# Patient Record
Sex: FEMALE | Race: WHITE | NOT HISPANIC OR LATINO | URBAN - METROPOLITAN AREA
[De-identification: names, ages, dates, MRNs, and addresses within clinical notes are randomized per-mention and may not be internally consistent; named-entity substitution may affect disease eponyms.]

---

## 2018-01-03 ENCOUNTER — APPOINTMENT (RX ONLY)
Dept: URBAN - METROPOLITAN AREA CLINIC 304 | Facility: CLINIC | Age: 38
Setting detail: DERMATOLOGY
End: 2018-01-03

## 2018-01-03 DIAGNOSIS — L81.8 OTHER SPECIFIED DISORDERS OF PIGMENTATION: ICD-10-CM

## 2018-01-03 PROCEDURE — ? Q-SWITCHED LASER

## 2018-01-03 PROCEDURE — ? COUNSELING

## 2018-01-03 ASSESSMENT — LOCATION ZONE DERM
LOCATION ZONE: TRUNK
LOCATION ZONE: ARM

## 2018-01-03 ASSESSMENT — LOCATION DETAILED DESCRIPTION DERM
LOCATION DETAILED: LEFT ANTERIOR PROXIMAL UPPER ARM
LOCATION DETAILED: RIGHT ANTERIOR PROXIMAL UPPER ARM
LOCATION DETAILED: SUPERIOR THORACIC SPINE
LOCATION DETAILED: RIGHT ANTERIOR SHOULDER

## 2018-01-03 ASSESSMENT — LOCATION SIMPLE DESCRIPTION DERM
LOCATION SIMPLE: UPPER BACK
LOCATION SIMPLE: RIGHT UPPER ARM
LOCATION SIMPLE: RIGHT SHOULDER
LOCATION SIMPLE: LEFT UPPER ARM

## 2018-01-03 NOTE — PROCEDURE: Q-SWITCHED LASER
Spot Size In Mm: 3
External Cooling Fan Speed: 0
Frequency In Hz: 1
Spot Size In Mm: 4
Fluence: 6.5
Fluence: 7
Endpoint: Immediate endpoint whitening
Frequency In Hz: 5
Consent: Written consent obtained, risks reviewed including but not limited to crusting, scabbing, blistering, scarring, darker or lighter pigmentary change, systemic reactions, ulceration, incidental hair removal, bruising, and/or incomplete removal.
Price (Use Numbers Only, No Special Characters Or $): 50.00
Anesthesia Type: 1% lidocaine with epinephrine
Detail Level: Detailed
Frequency In Hz: 2
Post-Care Instructions: I reviewed with the patient in detail post-care instructions. Patient should avoid sunlight and wear sun protection.
Number Of Pulses: 164

## 2018-01-12 ENCOUNTER — APPOINTMENT (RX ONLY)
Dept: URBAN - METROPOLITAN AREA CLINIC 304 | Facility: CLINIC | Age: 38
Setting detail: DERMATOLOGY
End: 2018-01-12

## 2018-01-12 DIAGNOSIS — L81.8 OTHER SPECIFIED DISORDERS OF PIGMENTATION: ICD-10-CM

## 2018-01-12 PROCEDURE — ? LASER TATTOO REMOVAL

## 2018-01-12 ASSESSMENT — LOCATION ZONE DERM
LOCATION ZONE: ARM
LOCATION ZONE: SCALP

## 2018-01-12 ASSESSMENT — LOCATION SIMPLE DESCRIPTION DERM
LOCATION SIMPLE: HAIR
LOCATION SIMPLE: LEFT UPPER ARM

## 2018-01-12 ASSESSMENT — LOCATION DETAILED DESCRIPTION DERM
LOCATION DETAILED: LEFT ANTERIOR PROXIMAL UPPER ARM
LOCATION DETAILED: HAIR

## 2018-01-12 NOTE — HPI: COSMETIC (LASER TATTOO REMOVAL)
Have You Had Laser Tattoo Removal Before?: has not had previous treatments
When Outside In The Sun, Do You...: always burns, never tans

## 2018-01-12 NOTE — PROCEDURE: LASER TATTOO REMOVAL
Pre-Procedure Text: The treatment areas were cleaned and treated with the laser parameters noted above.
Post-Procedure Text: Post care reviewed with patient.
Laser Type: Q-switched Alexandrite 755nm
Price (Use Numbers Only, No Special Characters Or $): 50
Tattoo Color Treated: Black
Laser Type: Q-switched Nd:Yag 1064nm
Spot Size In Mm: 0
Anesthesia Type: 1% lidocaine with epinephrine
Consent: Written consent obtained, risks reviewed including but not limited to crusting, scabbing, blistering, scarring, darker or lighter pigmentary change, systemic reactions, ulceration, incidental hair removal, bruising, and/or incomplete removal.
Spot Size In Mm: 4
Detail Level: Detailed
Post-Care Instructions: I reviewed with the patient in detail post-care instructions. Patient should apply vaseline twice daily to tattoo and keep it covered with a non-stick adhesive dressing.
Endpoint Text: Immediate endpoint: skin whitening .  1064 4mm 4J/cm2 309 pulses
Area In Cm^2: 2

## 2018-01-19 ENCOUNTER — APPOINTMENT (RX ONLY)
Dept: URBAN - METROPOLITAN AREA CLINIC 304 | Facility: CLINIC | Age: 38
Setting detail: DERMATOLOGY
End: 2018-01-19

## 2018-01-19 DIAGNOSIS — L81.8 OTHER SPECIFIED DISORDERS OF PIGMENTATION: ICD-10-CM

## 2018-01-19 PROCEDURE — ? LASER TATTOO REMOVAL

## 2018-01-19 ASSESSMENT — LOCATION ZONE DERM: LOCATION ZONE: TRUNK

## 2018-01-19 ASSESSMENT — LOCATION DETAILED DESCRIPTION DERM: LOCATION DETAILED: SUPERIOR THORACIC SPINE

## 2018-01-19 ASSESSMENT — LOCATION SIMPLE DESCRIPTION DERM: LOCATION SIMPLE: UPPER BACK

## 2018-01-19 NOTE — PROCEDURE: LASER TATTOO REMOVAL
Consent: Written consent obtained, risks reviewed including but not limited to crusting, scabbing, blistering, scarring, darker or lighter pigmentary change, systemic reactions, ulceration, incidental hair removal, bruising, and/or incomplete removal.
Price (Use Numbers Only, No Special Characters Or $): 50
Spot Size In Mm: 0
Fluence: 4
Tattoo Color Treated: Black
Spot Size In Mm: 3
Laser Type: Q-switched Alexandrite 755nm
Laser Type: Q-switched Nd:Yag 1064nm
Endpoint Text: Immediate endpoint: skin whitening .  1064 4mm 4j/cm2 438 pulses
Pre-Procedure Text: The treatment areas were cleaned and treated with the laser parameters noted above.
Fluence: 5
Area In Cm^2: 2
Fluence: 10
Detail Level: Detailed
Anesthesia Type: 1% lidocaine with epinephrine
Post-Procedure Text: Post care reviewed with patient.
Post-Care Instructions: I reviewed with the patient in detail post-care instructions. Patient should apply vaseline twice daily to tattoo and keep it covered with a non-stick adhesive dressing.

## 2018-02-02 ENCOUNTER — APPOINTMENT (RX ONLY)
Dept: URBAN - METROPOLITAN AREA CLINIC 304 | Facility: CLINIC | Age: 38
Setting detail: DERMATOLOGY
End: 2018-02-02

## 2018-02-02 DIAGNOSIS — L81.8 OTHER SPECIFIED DISORDERS OF PIGMENTATION: ICD-10-CM

## 2018-02-02 PROCEDURE — ? LASER TATTOO REMOVAL

## 2018-02-02 ASSESSMENT — LOCATION DETAILED DESCRIPTION DERM: LOCATION DETAILED: RIGHT ANTERIOR SHOULDER

## 2018-02-02 ASSESSMENT — LOCATION SIMPLE DESCRIPTION DERM: LOCATION SIMPLE: RIGHT SHOULDER

## 2018-02-02 ASSESSMENT — LOCATION ZONE DERM: LOCATION ZONE: ARM

## 2018-02-02 NOTE — PROCEDURE: LASER TATTOO REMOVAL
Post-Care Instructions: I reviewed with the patient in detail post-care instructions. Patient should apply vaseline twice daily to tattoo and keep it covered with a non-stick adhesive dressing.
Tattoo Color Treated: Black
Spot Size In Mm: 4
Fluence: 0
Laser Type: Q-switched Alexandrite 755nm
Detail Level: Detailed
Endpoint Text: Immediate endpoint: skin whitening .  1064 4mm 4j/cm2 127pulses
Area In Cm^2: 2
Spot Size In Mm: 3
Consent: Written consent obtained, risks reviewed including but not limited to crusting, scabbing, blistering, scarring, darker or lighter pigmentary change, systemic reactions, ulceration, incidental hair removal, bruising, and/or incomplete removal.
Pre-Procedure Text: The treatment areas were cleaned and treated with the laser parameters noted above.
Laser Type: Q-switched Nd:Yag 1064nm
Post-Procedure Text: Post care reviewed with patient.
Fluence: 10
Price (Use Numbers Only, No Special Characters Or $): 50
Fluence: 5
Anesthesia Type: 1% lidocaine with epinephrine

## 2018-02-14 ENCOUNTER — APPOINTMENT (RX ONLY)
Dept: URBAN - METROPOLITAN AREA CLINIC 304 | Facility: CLINIC | Age: 38
Setting detail: DERMATOLOGY
End: 2018-02-14

## 2018-02-14 DIAGNOSIS — L81.8 OTHER SPECIFIED DISORDERS OF PIGMENTATION: ICD-10-CM

## 2018-02-14 PROCEDURE — ? LASER TATTOO REMOVAL

## 2018-02-14 ASSESSMENT — LOCATION DETAILED DESCRIPTION DERM
LOCATION DETAILED: LEFT ANTERIOR PROXIMAL UPPER ARM
LOCATION DETAILED: RIGHT POSTERIOR NECK

## 2018-02-14 ASSESSMENT — LOCATION SIMPLE DESCRIPTION DERM
LOCATION SIMPLE: LEFT UPPER ARM
LOCATION SIMPLE: POSTERIOR NECK

## 2018-02-14 ASSESSMENT — LOCATION ZONE DERM
LOCATION ZONE: ARM
LOCATION ZONE: NECK

## 2018-02-14 NOTE — PROCEDURE: LASER TATTOO REMOVAL
Fluence: 5
Detail Level: Detailed
Laser Type: Q-switched Alexandrite 755nm
Anesthesia Type: 1% lidocaine with epinephrine
Spot Size In Mm: 3
Spot Size In Mm: 0
Tattoo Color Treated: Black
Laser Type: Q-switched Nd:Yag 1064nm
Post-Procedure Text: Post care reviewed with patient.
Post-Care Instructions: I reviewed with the patient in detail post-care instructions. Patient should apply vaseline twice daily to tattoo and keep it covered with a non-stick adhesive dressing.
Spot Size In Mm: 4
Price (Use Numbers Only, No Special Characters Or $): 50
Fluence: 10
Pre-Procedure Text: The treatment areas were cleaned and treated with the laser parameters noted above.
Endpoint Text: Immediate endpoint: skin whitening .  1064 4mm 4j/cm2 236 pulses
Consent: Written consent obtained, risks reviewed including but not limited to crusting, scabbing, blistering, scarring, darker or lighter pigmentary change, systemic reactions, ulceration, incidental hair removal, bruising, and/or incomplete removal.

## 2018-02-23 ENCOUNTER — APPOINTMENT (RX ONLY)
Dept: URBAN - METROPOLITAN AREA CLINIC 304 | Facility: CLINIC | Age: 38
Setting detail: DERMATOLOGY
End: 2018-02-23

## 2018-02-23 DIAGNOSIS — L81.8 OTHER SPECIFIED DISORDERS OF PIGMENTATION: ICD-10-CM

## 2018-02-23 PROCEDURE — ? LASER TATTOO REMOVAL

## 2018-02-23 ASSESSMENT — LOCATION DETAILED DESCRIPTION DERM: LOCATION DETAILED: SUPERIOR THORACIC SPINE

## 2018-02-23 ASSESSMENT — LOCATION ZONE DERM: LOCATION ZONE: TRUNK

## 2018-02-23 ASSESSMENT — LOCATION SIMPLE DESCRIPTION DERM: LOCATION SIMPLE: UPPER BACK

## 2018-02-23 NOTE — PROCEDURE: LASER TATTOO REMOVAL
Pre-Procedure Text: The treatment areas were cleaned and treated with the laser parameters noted above.
Post-Care Instructions: I reviewed with the patient in detail post-care instructions. Patient should apply vaseline twice daily to tattoo and keep it covered with a non-stick adhesive dressing.
Spot Size In Mm: 3
Fluence: 4
Fluence: 0
Post-Procedure Text: Post care reviewed with patient.
Fluence: 10
Endpoint Text: Immediate endpoint: skin whitening .  1064 4mm 4j/cm2 374 pulses
Laser Type: Q-switched Alexandrite 755nm
Tattoo Color Treated: Black
Anesthesia Type: 1% lidocaine with epinephrine
Consent: Written consent obtained, risks reviewed including but not limited to crusting, scabbing, blistering, scarring, darker or lighter pigmentary change, systemic reactions, ulceration, incidental hair removal, bruising, and/or incomplete removal.
Detail Level: Detailed
Laser Type: Q-switched Nd:Yag 1064nm
Area In Cm^2: 15
Price (Use Numbers Only, No Special Characters Or $): 50
Fluence: 5

## 2018-03-07 ENCOUNTER — APPOINTMENT (RX ONLY)
Dept: URBAN - METROPOLITAN AREA CLINIC 304 | Facility: CLINIC | Age: 38
Setting detail: DERMATOLOGY
End: 2018-03-07

## 2018-03-07 DIAGNOSIS — L81.8 OTHER SPECIFIED DISORDERS OF PIGMENTATION: ICD-10-CM

## 2018-03-07 PROCEDURE — ? LASER TATTOO REMOVAL

## 2018-03-07 ASSESSMENT — LOCATION DETAILED DESCRIPTION DERM: LOCATION DETAILED: RIGHT POSTERIOR SHOULDER

## 2018-03-07 ASSESSMENT — LOCATION SIMPLE DESCRIPTION DERM: LOCATION SIMPLE: RIGHT SHOULDER

## 2018-03-07 ASSESSMENT — LOCATION ZONE DERM: LOCATION ZONE: ARM

## 2018-03-07 NOTE — PROCEDURE: LASER TATTOO REMOVAL
Laser Type: Q-switched Nd:Yag 1064nm
Pre-Procedure Text: The treatment areas were cleaned and treated with the laser parameters noted above.
External Cooling Fan Speed: 0
Anesthesia Type: 1% lidocaine with epinephrine
Laser Type: Q-switched Alexandrite 755nm
Fluence: 4
Area In Cm^2: 4.5
Consent: Written consent obtained, risks reviewed including but not limited to crusting, scabbing, blistering, scarring, darker or lighter pigmentary change, systemic reactions, ulceration, incidental hair removal, bruising, and/or incomplete removal.
Spot Size In Mm: 3
Fluence: 10
Endpoint Text: Immediate endpoint: erythema .  1064 4mm 4j/cm2 92pulses
Tattoo Color Treated: Black
Fluence: 5
Price (Use Numbers Only, No Special Characters Or $): 50
Post-Procedure Text: Post care reviewed with patient.
Post-Care Instructions: I reviewed with the patient in detail post-care instructions. Patient should apply vaseline twice daily to tattoo and keep it covered with a non-stick adhesive dressing.
Detail Level: Detailed

## 2018-03-23 ENCOUNTER — APPOINTMENT (RX ONLY)
Dept: URBAN - METROPOLITAN AREA CLINIC 304 | Facility: CLINIC | Age: 38
Setting detail: DERMATOLOGY
End: 2018-03-23

## 2018-03-23 DIAGNOSIS — L81.8 OTHER SPECIFIED DISORDERS OF PIGMENTATION: ICD-10-CM

## 2018-03-23 PROCEDURE — ? LASER TATTOO REMOVAL

## 2018-03-23 ASSESSMENT — LOCATION ZONE DERM
LOCATION ZONE: TRUNK
LOCATION ZONE: ARM
LOCATION ZONE: NECK

## 2018-03-23 ASSESSMENT — LOCATION DETAILED DESCRIPTION DERM
LOCATION DETAILED: RIGHT INFERIOR POSTERIOR NECK
LOCATION DETAILED: LEFT SUPERIOR MEDIAL UPPER BACK
LOCATION DETAILED: LEFT ANTERIOR PROXIMAL UPPER ARM

## 2018-03-23 ASSESSMENT — LOCATION SIMPLE DESCRIPTION DERM
LOCATION SIMPLE: LEFT UPPER ARM
LOCATION SIMPLE: POSTERIOR NECK
LOCATION SIMPLE: LEFT UPPER BACK

## 2018-03-23 NOTE — PROCEDURE: LASER TATTOO REMOVAL
Endpoint Text: Immediate endpoint: erythema and frosting.  1064 4mm 4j/cm2 391 pulses
Area In Cm^2: 15
Post-Care Instructions: I reviewed with the patient in detail post-care instructions. Patient should apply vaseline twice daily to tattoo and keep it covered with a non-stick adhesive dressing.
Anesthesia Type: 1% lidocaine with epinephrine
External Cooling Fan Speed: 0
Fluence: 5
Spot Size In Mm: 4
Pre-Procedure Text: The treatment areas were cleaned and treated with the laser parameters noted above.
Anesthesia Volume In Cc: 3
Tattoo Color Treated: Black
Laser Type: Q-switched Alexandrite 755nm
Laser Type: Q-switched Nd:Yag 1064nm
Post-Procedure Text: Post care reviewed with patient.
Price (Use Numbers Only, No Special Characters Or $): 100
Detail Level: Detailed
Consent: Written consent obtained, risks reviewed including but not limited to crusting, scabbing, blistering, scarring, darker or lighter pigmentary change, systemic reactions, ulceration, incidental hair removal, bruising, and/or incomplete removal.

## 2018-04-06 ENCOUNTER — APPOINTMENT (RX ONLY)
Dept: URBAN - METROPOLITAN AREA CLINIC 304 | Facility: CLINIC | Age: 38
Setting detail: DERMATOLOGY
End: 2018-04-06

## 2018-04-06 DIAGNOSIS — L81.8 OTHER SPECIFIED DISORDERS OF PIGMENTATION: ICD-10-CM

## 2018-04-06 PROCEDURE — ? LASER TATTOO REMOVAL

## 2018-04-06 ASSESSMENT — LOCATION ZONE DERM: LOCATION ZONE: ARM

## 2018-04-06 ASSESSMENT — LOCATION DETAILED DESCRIPTION DERM: LOCATION DETAILED: RIGHT ANTERIOR SHOULDER

## 2018-04-06 ASSESSMENT — LOCATION SIMPLE DESCRIPTION DERM: LOCATION SIMPLE: RIGHT SHOULDER

## 2018-04-06 NOTE — PROCEDURE: LASER TATTOO REMOVAL
Spot Size In Mm: 4
Tattoo Color Treated: Black
Post-Care Instructions: I reviewed with the patient in detail post-care instructions. Patient should apply vaseline twice daily to tattoo and keep it covered with a non-stick adhesive dressing.
Anesthesia Volume In Cc: 3
Fluence: 0
Anesthesia Type: 1% lidocaine with epinephrine
Fluence: 5
Laser Type: Q-switched Nd:Yag 1064nm
Laser Type: Q-switched Alexandrite 755nm
Endpoint Text: Immediate endpoint: erythema 1064 4mm 4.2 j/cm2 92 pulses TX#4
Pre-Procedure Text: The treatment areas were cleaned and treated with the laser parameters noted above.
Fluence: 4.2
Price (Use Numbers Only, No Special Characters Or $): 50
Post-Procedure Text: Post care reviewed with patient.
Consent: Written consent obtained, risks reviewed including but not limited to crusting, scabbing, blistering, scarring, darker or lighter pigmentary change, systemic reactions, ulceration, incidental hair removal, bruising, and/or incomplete removal.
Detail Level: Detailed

## 2018-04-24 ENCOUNTER — APPOINTMENT (RX ONLY)
Dept: URBAN - METROPOLITAN AREA CLINIC 304 | Facility: CLINIC | Age: 38
Setting detail: DERMATOLOGY
End: 2018-04-24

## 2018-04-24 DIAGNOSIS — L81.8 OTHER SPECIFIED DISORDERS OF PIGMENTATION: ICD-10-CM

## 2018-04-24 PROCEDURE — ? LASER TATTOO REMOVAL

## 2018-04-24 ASSESSMENT — LOCATION SIMPLE DESCRIPTION DERM
LOCATION SIMPLE: LEFT UPPER ARM
LOCATION SIMPLE: TRAPEZIAL NECK

## 2018-04-24 ASSESSMENT — LOCATION ZONE DERM
LOCATION ZONE: NECK
LOCATION ZONE: ARM

## 2018-04-24 ASSESSMENT — LOCATION DETAILED DESCRIPTION DERM
LOCATION DETAILED: LEFT ANTERIOR PROXIMAL UPPER ARM
LOCATION DETAILED: MID TRAPEZIAL NECK

## 2018-04-27 ENCOUNTER — APPOINTMENT (RX ONLY)
Dept: URBAN - METROPOLITAN AREA CLINIC 304 | Facility: CLINIC | Age: 38
Setting detail: DERMATOLOGY
End: 2018-04-27

## 2018-04-27 DIAGNOSIS — L81.8 OTHER SPECIFIED DISORDERS OF PIGMENTATION: ICD-10-CM

## 2018-04-27 PROCEDURE — ? LASER TATTOO REMOVAL

## 2018-04-27 ASSESSMENT — LOCATION SIMPLE DESCRIPTION DERM: LOCATION SIMPLE: UPPER BACK

## 2018-04-27 ASSESSMENT — LOCATION DETAILED DESCRIPTION DERM: LOCATION DETAILED: SUPERIOR THORACIC SPINE

## 2018-04-27 ASSESSMENT — LOCATION ZONE DERM: LOCATION ZONE: TRUNK

## 2018-04-27 NOTE — PROCEDURE: LASER TATTOO REMOVAL
Fluence: 0
Laser Type: Q-switched Alexandrite 755nm
Fluence: 4.2
Pre-Procedure Text: The treatment areas were cleaned and treated with the laser parameters noted above.
Post-Procedure Text: Post care reviewed with patient.
Tattoo Color Treated: Black
Laser Type: Q-switched Nd:Yag 1064nm
Anesthesia Type: 1% lidocaine with epinephrine
Spot Size In Mm: 3
Fluence: 11
Consent: Written consent obtained, risks reviewed including but not limited to crusting, scabbing, blistering, scarring, darker or lighter pigmentary change, systemic reactions, ulceration, incidental hair removal, bruising, and/or incomplete removal.
Endpoint Text: Immediate endpoint: erythema 1064 4mm 4.4j/cm2 322pulses
Detail Level: Detailed
Area In Cm^2: 10
Price (Use Numbers Only, No Special Characters Or $): 50
Spot Size In Mm: 4
Post-Care Instructions: I reviewed with the patient in detail post-care instructions. Patient should apply vaseline twice daily to tattoo and keep it covered with a non-stick adhesive dressing.
Fluence: 5

## 2018-05-03 ENCOUNTER — APPOINTMENT (RX ONLY)
Dept: URBAN - METROPOLITAN AREA CLINIC 304 | Facility: CLINIC | Age: 38
Setting detail: DERMATOLOGY
End: 2018-05-03

## 2018-05-03 DIAGNOSIS — L81.8 OTHER SPECIFIED DISORDERS OF PIGMENTATION: ICD-10-CM

## 2018-05-03 PROCEDURE — ? LASER TATTOO REMOVAL

## 2018-05-03 ASSESSMENT — LOCATION ZONE DERM: LOCATION ZONE: ARM

## 2018-05-03 ASSESSMENT — LOCATION SIMPLE DESCRIPTION DERM: LOCATION SIMPLE: RIGHT SHOULDER

## 2018-05-03 ASSESSMENT — LOCATION DETAILED DESCRIPTION DERM: LOCATION DETAILED: RIGHT ANTERIOR SHOULDER

## 2018-05-03 NOTE — PROCEDURE: LASER TATTOO REMOVAL
Spot Size In Mm: 4
Detail Level: Detailed
Spot Size In Mm: 3
External Cooling Fan Speed: 0
Laser Type: Q-switched Alexandrite 755nm
Pre-Procedure Text: The treatment areas were cleaned and treated with the laser parameters noted above.
Laser Type: Q-switched Nd:Yag 1064nm
Post-Care Instructions: I reviewed with the patient in detail post-care instructions. Patient should apply vaseline twice daily to tattoo and keep it covered with a non-stick adhesive dressing.
Consent: Written consent obtained, risks reviewed including but not limited to crusting, scabbing, blistering, scarring, darker or lighter pigmentary change, systemic reactions, ulceration, incidental hair removal, bruising, and/or incomplete removal.
Fluence: 11
Fluence: 5
Price (Use Numbers Only, No Special Characters Or $): 50
Endpoint Text: Immediate endpoint: erythema 1064 4mm 4.2j/cm2 104 pulses
Post-Procedure Text: Post care reviewed with patient.
Tattoo Color Treated: Black
Fluence: 4.2
Anesthesia Type: 1% lidocaine with epinephrine

## 2018-05-25 ENCOUNTER — APPOINTMENT (RX ONLY)
Dept: URBAN - METROPOLITAN AREA CLINIC 304 | Facility: CLINIC | Age: 38
Setting detail: DERMATOLOGY
End: 2018-05-25

## 2018-05-25 DIAGNOSIS — L81.8 OTHER SPECIFIED DISORDERS OF PIGMENTATION: ICD-10-CM

## 2018-05-25 PROCEDURE — ? LASER TATTOO REMOVAL

## 2018-05-25 ASSESSMENT — LOCATION SIMPLE DESCRIPTION DERM
LOCATION SIMPLE: POSTERIOR NECK
LOCATION SIMPLE: LEFT UPPER ARM

## 2018-05-25 ASSESSMENT — LOCATION DETAILED DESCRIPTION DERM
LOCATION DETAILED: LEFT ANTERIOR PROXIMAL UPPER ARM
LOCATION DETAILED: RIGHT INFERIOR POSTERIOR NECK

## 2018-05-25 ASSESSMENT — LOCATION ZONE DERM
LOCATION ZONE: NECK
LOCATION ZONE: ARM

## 2018-05-25 NOTE — PROCEDURE: LASER TATTOO REMOVAL
Fluence: 4.4
Laser Type: Q-switched Alexandrite 755nm
Consent: Written consent obtained, risks reviewed including but not limited to crusting, scabbing, blistering, scarring, darker or lighter pigmentary change, systemic reactions, ulceration, incidental hair removal, bruising, and/or incomplete removal.
Spot Size In Mm: 0
Price (Use Numbers Only, No Special Characters Or $): 50
Detail Level: Detailed
Fluence: 11
Post-Procedure Text: Post care reviewed with patient.
Tattoo Color Treated: Black
Anesthesia Type: 1% lidocaine with epinephrine
Spot Size In Mm: 3
Area In Cm^2: 10
Pre-Procedure Text: The treatment areas were cleaned and treated with the laser parameters noted above.
Spot Size In Mm: 4
Fluence: 5
Post-Care Instructions: I reviewed with the patient in detail post-care instructions. Patient should apply vaseline twice daily to tattoo and keep it covered with a non-stick adhesive dressing.
Endpoint Text: Erythema. 4mm spot 4.4j/cm2 213 pulses
Laser Type: Q-switched Nd:Yag 1064nm

## 2018-06-14 ENCOUNTER — APPOINTMENT (RX ONLY)
Dept: URBAN - METROPOLITAN AREA CLINIC 304 | Facility: CLINIC | Age: 38
Setting detail: DERMATOLOGY
End: 2018-06-14

## 2018-06-14 DIAGNOSIS — L81.8 OTHER SPECIFIED DISORDERS OF PIGMENTATION: ICD-10-CM

## 2018-06-14 PROCEDURE — ? LASER TATTOO REMOVAL

## 2018-06-14 ASSESSMENT — LOCATION DETAILED DESCRIPTION DERM
LOCATION DETAILED: RIGHT MEDIAL TRAPEZIAL NECK
LOCATION DETAILED: RIGHT POSTERIOR SHOULDER
LOCATION DETAILED: MID POSTERIOR NECK

## 2018-06-14 ASSESSMENT — LOCATION ZONE DERM
LOCATION ZONE: ARM
LOCATION ZONE: NECK

## 2018-06-14 ASSESSMENT — LOCATION SIMPLE DESCRIPTION DERM
LOCATION SIMPLE: RIGHT SHOULDER
LOCATION SIMPLE: POSTERIOR NECK

## 2018-06-14 NOTE — PROCEDURE: LASER TATTOO REMOVAL
Detail Level: Detailed
Price (Use Numbers Only, No Special Characters Or $): 50
Post-Care Instructions: I reviewed with the patient in detail post-care instructions. Patient should apply vaseline twice daily to tattoo and keep it covered with a non-stick adhesive dressing.
Laser Type: Q-switched Alexandrite 755nm
Tattoo Color Treated: Black
Area In Cm^2: 12
Anesthesia Type: 1% lidocaine with epinephrine
Spot Size In Mm: 0
Pre-Procedure Text: The treatment areas were cleaned and treated with the laser parameters noted above.
Fluence: 11
Spot Size In Mm: 3
Fluence: 4.4
Fluence: 5
Post-Procedure Text: Post care reviewed with patient.
Laser Type: Q-switched Nd:Yag 1064nm
Spot Size In Mm: 4
Consent: Written consent obtained, risks reviewed including but not limited to crusting, scabbing, blistering, scarring, darker or lighter pigmentary change, systemic reactions, ulceration, incidental hair removal, bruising, and/or incomplete removal.
Endpoint Text: Erythema. 4mm 4.4j/cm2 457pulses

## 2018-06-28 ENCOUNTER — APPOINTMENT (RX ONLY)
Dept: URBAN - METROPOLITAN AREA CLINIC 304 | Facility: CLINIC | Age: 38
Setting detail: DERMATOLOGY
End: 2018-06-28

## 2018-06-28 DIAGNOSIS — L81.8 OTHER SPECIFIED DISORDERS OF PIGMENTATION: ICD-10-CM

## 2018-06-28 PROCEDURE — ? LASER TATTOO REMOVAL

## 2018-06-28 ASSESSMENT — LOCATION ZONE DERM: LOCATION ZONE: ARM

## 2018-06-28 ASSESSMENT — LOCATION SIMPLE DESCRIPTION DERM: LOCATION SIMPLE: LEFT UPPER ARM

## 2018-06-28 ASSESSMENT — LOCATION DETAILED DESCRIPTION DERM: LOCATION DETAILED: LEFT ANTERIOR PROXIMAL UPPER ARM

## 2018-06-28 NOTE — PROCEDURE: LASER TATTOO REMOVAL
Laser Type: Q-switched Alexandrite 755nm
Detail Level: Detailed
Fluence: 4.8
Consent: Written consent obtained, risks reviewed including but not limited to crusting, scabbing, blistering, scarring, darker or lighter pigmentary change, systemic reactions, ulceration, incidental hair removal, bruising, and/or incomplete removal.
Spot Size In Mm: 3
Tattoo Color Treated: Black
Fluence: 11
Endpoint Text: Erythema. 4mm 4.8j/cm2 224 pulses
Fluence: 0
Fluence: 5
Laser Type: Q-switched Nd:Yag 1064nm
Area In Cm^2: 12
Spot Size In Mm: 4
Post-Care Instructions: I reviewed with the patient in detail post-care instructions. Patient should apply vaseline twice daily to tattoo and keep it covered with a non-stick adhesive dressing.
Anesthesia Type: 1% lidocaine with epinephrine
Post-Procedure Text: Post care reviewed with patient.
Pre-Procedure Text: The treatment areas were cleaned and treated with the laser parameters noted above.
Price (Use Numbers Only, No Special Characters Or $): 50

## 2018-07-18 ENCOUNTER — APPOINTMENT (RX ONLY)
Dept: URBAN - METROPOLITAN AREA CLINIC 304 | Facility: CLINIC | Age: 38
Setting detail: DERMATOLOGY
End: 2018-07-18

## 2018-07-18 DIAGNOSIS — L81.8 OTHER SPECIFIED DISORDERS OF PIGMENTATION: ICD-10-CM

## 2018-07-18 PROCEDURE — ? LASER TATTOO REMOVAL

## 2018-07-18 ASSESSMENT — LOCATION DETAILED DESCRIPTION DERM: LOCATION DETAILED: RIGHT POSTERIOR SHOULDER

## 2018-07-18 ASSESSMENT — LOCATION ZONE DERM: LOCATION ZONE: ARM

## 2018-07-18 ASSESSMENT — LOCATION SIMPLE DESCRIPTION DERM: LOCATION SIMPLE: RIGHT SHOULDER

## 2018-07-18 NOTE — PROCEDURE: LASER TATTOO REMOVAL
Post-Care Instructions: I reviewed with the patient in detail post-care instructions. Patient should apply vaseline twice daily to tattoo and keep it covered with a non-stick adhesive dressing.
Fluence: 0
Area In Cm^2: 6
Spot Size In Mm: 3
Laser Type: Q-switched Alexandrite 755nm
Detail Level: Detailed
Anesthesia Type: 1% lidocaine with epinephrine
Post-Procedure Text: Post care reviewed with patient.
Consent: Written consent obtained, risks reviewed including but not limited to crusting, scabbing, blistering, scarring, darker or lighter pigmentary change, systemic reactions, ulceration, incidental hair removal, bruising, and/or incomplete removal.
Spot Size In Mm: 4
Laser Type: Q-switched Nd:Yag 1064nm
Endpoint Text: Erythema. 4mm 5j/cm2 103pulses
Tattoo Color Treated: Black
Fluence: 11
Fluence: 5
Price (Use Numbers Only, No Special Characters Or $): 50
Pre-Procedure Text: The treatment areas were cleaned and treated with the laser parameters noted above.

## 2018-07-25 ENCOUNTER — APPOINTMENT (RX ONLY)
Dept: URBAN - METROPOLITAN AREA CLINIC 304 | Facility: CLINIC | Age: 38
Setting detail: DERMATOLOGY
End: 2018-07-25

## 2018-07-25 DIAGNOSIS — L81.8 OTHER SPECIFIED DISORDERS OF PIGMENTATION: ICD-10-CM

## 2018-07-25 PROCEDURE — ? LASER TATTOO REMOVAL

## 2018-07-25 ASSESSMENT — LOCATION ZONE DERM
LOCATION ZONE: TRUNK
LOCATION ZONE: NECK

## 2018-07-25 ASSESSMENT — LOCATION SIMPLE DESCRIPTION DERM
LOCATION SIMPLE: POSTERIOR NECK
LOCATION SIMPLE: UPPER BACK

## 2018-07-25 ASSESSMENT — LOCATION DETAILED DESCRIPTION DERM
LOCATION DETAILED: RIGHT POSTERIOR NECK
LOCATION DETAILED: SUPERIOR THORACIC SPINE

## 2018-07-25 NOTE — PROCEDURE: LASER TATTOO REMOVAL
Price (Use Numbers Only, No Special Characters Or $): 50
Spot Size In Mm: 3
Post-Care Instructions: I reviewed with the patient in detail post-care instructions. Patient should apply vaseline twice daily to tattoo and keep it covered with a non-stick adhesive dressing.
Laser Type: Q-switched Alexandrite 755nm
Fluence: 5
Endpoint Text: Erythema.  1064 4mm spot 5j/cm2 325 pulses
Anesthesia Type: 1% lidocaine with epinephrine
Fluence: 11
Fluence: 0
Spot Size In Mm: 4
Detail Level: Detailed
Consent: Written consent obtained, risks reviewed including but not limited to crusting, scabbing, blistering, scarring, darker or lighter pigmentary change, systemic reactions, ulceration, incidental hair removal, bruising, and/or incomplete removal.
Area In Cm^2: 10
Tattoo Color Treated: Black
Laser Type: Q-switched Nd:Yag 1064nm
Pre-Procedure Text: The treatment areas were cleaned and treated with the laser parameters noted above.
Post-Procedure Text: Post care reviewed with patient.

## 2018-08-17 ENCOUNTER — APPOINTMENT (RX ONLY)
Dept: URBAN - METROPOLITAN AREA CLINIC 304 | Facility: CLINIC | Age: 38
Setting detail: DERMATOLOGY
End: 2018-08-17

## 2018-08-17 DIAGNOSIS — L81.8 OTHER SPECIFIED DISORDERS OF PIGMENTATION: ICD-10-CM

## 2018-08-17 PROCEDURE — ? LASER TATTOO REMOVAL

## 2018-08-17 ASSESSMENT — LOCATION SIMPLE DESCRIPTION DERM
LOCATION SIMPLE: RIGHT SHOULDER
LOCATION SIMPLE: LEFT SHOULDER

## 2018-08-17 ASSESSMENT — LOCATION ZONE DERM: LOCATION ZONE: ARM

## 2018-08-17 ASSESSMENT — LOCATION DETAILED DESCRIPTION DERM
LOCATION DETAILED: LEFT POSTERIOR SHOULDER
LOCATION DETAILED: RIGHT POSTERIOR SHOULDER

## 2018-08-17 NOTE — PROCEDURE: LASER TATTOO REMOVAL
Laser Type: Q-switched Alexandrite 755nm
Fluence: 5.5
Spot Size In Mm: 4
Fluence: 5
Endpoint Text: Erythema.  1064 4mm spot 5j/cm2 250 pulses
Post-Procedure Text: Post care reviewed with patient.
Pre-Procedure Text: The treatment areas were cleaned and treated with the laser parameters noted above.
Anesthesia Type: 1% lidocaine with epinephrine
Detail Level: Detailed
Consent: Written consent obtained, risks reviewed including but not limited to crusting, scabbing, blistering, scarring, darker or lighter pigmentary change, systemic reactions, ulceration, incidental hair removal, bruising, and/or incomplete removal.
Tattoo Color Treated: Black
Area In Cm^2: 6
Post-Care Instructions: I reviewed with the patient in detail post-care instructions. Patient should apply vaseline twice daily to tattoo and keep it covered with a non-stick adhesive dressing.
Price (Use Numbers Only, No Special Characters Or $): 50
Laser Type: Q-switched Nd:Yag 1064nm
Wavelength Used (Optional - Include Units): 48 pulses
External Cooling Fan Speed: 0
Spot Size In Mm: 2
Fluence: 8
Anesthesia Volume In Cc: 3